# Patient Record
Sex: MALE | Race: WHITE | URBAN - METROPOLITAN AREA
[De-identification: names, ages, dates, MRNs, and addresses within clinical notes are randomized per-mention and may not be internally consistent; named-entity substitution may affect disease eponyms.]

---

## 2017-11-28 ENCOUNTER — APPOINTMENT (RX ONLY)
Dept: URBAN - METROPOLITAN AREA CLINIC 20 | Facility: CLINIC | Age: 32
Setting detail: DERMATOLOGY
End: 2017-11-28

## 2017-11-28 DIAGNOSIS — L81.4 OTHER MELANIN HYPERPIGMENTATION: ICD-10-CM

## 2017-11-28 DIAGNOSIS — D18.0 HEMANGIOMA: ICD-10-CM

## 2017-11-28 DIAGNOSIS — L71.8 OTHER ROSACEA: ICD-10-CM

## 2017-11-28 DIAGNOSIS — D22 MELANOCYTIC NEVI: ICD-10-CM

## 2017-11-28 DIAGNOSIS — L82.1 OTHER SEBORRHEIC KERATOSIS: ICD-10-CM

## 2017-11-28 PROBLEM — D22.71 MELANOCYTIC NEVI OF RIGHT LOWER LIMB, INCLUDING HIP: Status: ACTIVE | Noted: 2017-11-28

## 2017-11-28 PROBLEM — D18.01 HEMANGIOMA OF SKIN AND SUBCUTANEOUS TISSUE: Status: ACTIVE | Noted: 2017-11-28

## 2017-11-28 PROBLEM — D22.39 MELANOCYTIC NEVI OF OTHER PARTS OF FACE: Status: ACTIVE | Noted: 2017-11-28

## 2017-11-28 PROBLEM — D22.72 MELANOCYTIC NEVI OF LEFT LOWER LIMB, INCLUDING HIP: Status: ACTIVE | Noted: 2017-11-28

## 2017-11-28 PROBLEM — D22.62 MELANOCYTIC NEVI OF LEFT UPPER LIMB, INCLUDING SHOULDER: Status: ACTIVE | Noted: 2017-11-28

## 2017-11-28 PROBLEM — D22.61 MELANOCYTIC NEVI OF RIGHT UPPER LIMB, INCLUDING SHOULDER: Status: ACTIVE | Noted: 2017-11-28

## 2017-11-28 PROBLEM — D22.5 MELANOCYTIC NEVI OF TRUNK: Status: ACTIVE | Noted: 2017-11-28

## 2017-11-28 PROCEDURE — ? SUNSCREEN RECOMMENDATIONS

## 2017-11-28 PROCEDURE — ? COUNSELING

## 2017-11-28 PROCEDURE — ? PRESCRIPTION

## 2017-11-28 PROCEDURE — 99203 OFFICE O/P NEW LOW 30 MIN: CPT

## 2017-11-28 RX ORDER — CLINDAMYCIN PHOSPHATE 10 MG/G
GEL TOPICAL
Qty: 1 | Refills: 1 | Status: ERX | COMMUNITY
Start: 2017-11-28

## 2017-11-28 RX ORDER — METRONIDAZOLE 10 MG/G
GEL TOPICAL
Qty: 1 | Refills: 3 | Status: ERX | COMMUNITY
Start: 2017-11-28

## 2017-11-28 RX ORDER — SODIUM SULFACETAMIDE AND SULFUR 80; 40 MG/ML; MG/ML
LOTION TOPICAL
Qty: 1 | Refills: 3 | Status: ERX | COMMUNITY
Start: 2017-11-28

## 2017-11-28 RX ADMIN — METRONIDAZOLE: 10 GEL TOPICAL at 00:00

## 2017-11-28 RX ADMIN — SODIUM SULFACETAMIDE AND SULFUR: 80; 40 LOTION TOPICAL at 00:00

## 2017-11-28 RX ADMIN — CLINDAMYCIN PHOSPHATE: 10 GEL TOPICAL at 00:00

## 2017-11-28 ASSESSMENT — LOCATION DETAILED DESCRIPTION DERM
LOCATION DETAILED: RIGHT INFERIOR CENTRAL MALAR CHEEK
LOCATION DETAILED: LEFT DISTAL POSTERIOR THIGH
LOCATION DETAILED: LEFT VENTRAL PROXIMAL FOREARM
LOCATION DETAILED: RIGHT DISTAL POSTERIOR UPPER ARM
LOCATION DETAILED: LEFT PROXIMAL POSTERIOR UPPER ARM
LOCATION DETAILED: RIGHT SUPERIOR UPPER BACK
LOCATION DETAILED: RIGHT VENTRAL PROXIMAL FOREARM
LOCATION DETAILED: INFERIOR THORACIC SPINE
LOCATION DETAILED: RIGHT DISTAL POSTERIOR THIGH
LOCATION DETAILED: LEFT LATERAL PROXIMAL PRETIBIAL REGION
LOCATION DETAILED: RIGHT SUPERIOR MEDIAL FOREHEAD
LOCATION DETAILED: RIGHT INFERIOR MEDIAL UPPER BACK
LOCATION DETAILED: RIGHT INFERIOR MEDIAL MIDBACK
LOCATION DETAILED: RIGHT INFERIOR FOREHEAD
LOCATION DETAILED: RIGHT PROXIMAL PRETIBIAL REGION

## 2017-11-28 ASSESSMENT — LOCATION SIMPLE DESCRIPTION DERM
LOCATION SIMPLE: LEFT FOREARM
LOCATION SIMPLE: RIGHT FOREHEAD
LOCATION SIMPLE: RIGHT FOREARM
LOCATION SIMPLE: RIGHT PRETIBIAL REGION
LOCATION SIMPLE: LEFT PRETIBIAL REGION
LOCATION SIMPLE: RIGHT LOWER BACK
LOCATION SIMPLE: LEFT POSTERIOR UPPER ARM
LOCATION SIMPLE: RIGHT POSTERIOR UPPER ARM
LOCATION SIMPLE: RIGHT UPPER BACK
LOCATION SIMPLE: UPPER BACK
LOCATION SIMPLE: LEFT POSTERIOR THIGH
LOCATION SIMPLE: RIGHT CHEEK
LOCATION SIMPLE: RIGHT POSTERIOR THIGH

## 2017-11-28 ASSESSMENT — LOCATION ZONE DERM
LOCATION ZONE: ARM
LOCATION ZONE: FACE
LOCATION ZONE: TRUNK
LOCATION ZONE: LEG

## 2018-12-10 ENCOUNTER — OFFICE VISIT (OUTPATIENT)
Dept: URGENT CARE | Facility: PHYSICIAN GROUP | Age: 33
End: 2018-12-10

## 2018-12-10 VITALS
WEIGHT: 289 LBS | OXYGEN SATURATION: 98 % | TEMPERATURE: 97.5 F | DIASTOLIC BLOOD PRESSURE: 84 MMHG | HEART RATE: 64 BPM | BODY MASS INDEX: 40.46 KG/M2 | HEIGHT: 71 IN | SYSTOLIC BLOOD PRESSURE: 126 MMHG

## 2018-12-10 DIAGNOSIS — Z02.89 HEALTH EXAMINATION OF DEFINED SUBPOPULATION: ICD-10-CM

## 2018-12-10 DIAGNOSIS — Z02.4 ENCOUNTER FOR DEPARTMENT OF TRANSPORTATION (DOT) EXAMINATION FOR TRUCKING LICENCE: ICD-10-CM

## 2018-12-10 PROCEDURE — 7100 PR DOT PHYSICAL: Performed by: PHYSICIAN ASSISTANT

## 2018-12-10 NOTE — PATIENT INSTRUCTIONS
FROM DOT WEBSITE:    What medications disqualify a ?  PreviousNext  A  cannot take a controlled substance or prescription medication without a prescription from a licensed practitioner.  If a  uses a drug identified in 21 CFR 1308.11 (391.42(b)(12)) or any other substance such as amphetamine, a narcotic, or any other habit forming drug, The  is medically unqualified.  There is an exception: the prescribing doctor can write that the  is safe to be a  while taking the medication. In this case, the Medical Examiner may, but does not have to certify the .  Any anti-seizure medication used for the prevention of seizures is disqualifying.  The Medical Examiner has 2 ways to determine if any medication a  uses will adversely affect safe operation of a CMV:  1. Review each medication - prescription, non-prescription and supplement  2. Request a letter from the prescribing doctor  Last Updated : September 18, 2017

## 2020-10-29 ENCOUNTER — OFFICE VISIT (OUTPATIENT)
Dept: URGENT CARE | Facility: PHYSICIAN GROUP | Age: 35
End: 2020-10-29

## 2020-10-29 VITALS
HEART RATE: 66 BPM | BODY MASS INDEX: 39.76 KG/M2 | TEMPERATURE: 96.9 F | DIASTOLIC BLOOD PRESSURE: 72 MMHG | HEIGHT: 71 IN | RESPIRATION RATE: 15 BRPM | WEIGHT: 284 LBS | OXYGEN SATURATION: 98 % | SYSTOLIC BLOOD PRESSURE: 118 MMHG

## 2020-10-29 DIAGNOSIS — Z02.4 ENCOUNTER FOR CDL (COMMERCIAL DRIVING LICENSE) EXAM: ICD-10-CM

## 2020-10-29 PROCEDURE — 7100 PR DOT PHYSICAL: Performed by: FAMILY MEDICINE

## 2020-10-29 RX ORDER — PHENTERMINE HYDROCHLORIDE 37.5 MG/1
37.5 CAPSULE ORAL EVERY MORNING
COMMUNITY

## 2020-10-29 NOTE — PROGRESS NOTES
"Subjective:      Isael Grossman Jr. is a 35 y.o. male who presents with No chief complaint on file.    See scanned history, physical  and clearance status in EPIC    Patient denies any history of seizures, dialysis, insulin use, pacemaker/defibrillator, syncope, arrhythmias/murmurs, vertigo/meniere's disease, illicit drug use, regular sedating medication use, ETOH abuse, or any weakness/paresthesias to extremities.     Cleared:  x2yrs    Renew: yes    Corrective lenses:  none          HPI    ROS       Objective:     /72   Pulse 66   Temp 36.1 °C (96.9 °F)   Resp 15   Ht 1.803 m (5' 11\")   Wt (!) 128.8 kg (284 lb)   SpO2 98%   BMI 39.61 kg/m²      Physical Exam            Assessment/Plan:            "

## 2022-11-07 ENCOUNTER — OFFICE VISIT (OUTPATIENT)
Dept: URGENT CARE | Facility: PHYSICIAN GROUP | Age: 37
End: 2022-11-07

## 2022-11-07 VITALS
WEIGHT: 265 LBS | HEART RATE: 75 BPM | OXYGEN SATURATION: 97 % | SYSTOLIC BLOOD PRESSURE: 126 MMHG | DIASTOLIC BLOOD PRESSURE: 80 MMHG | RESPIRATION RATE: 18 BRPM | BODY MASS INDEX: 37.1 KG/M2 | TEMPERATURE: 96.9 F | HEIGHT: 71 IN

## 2022-11-07 DIAGNOSIS — Z02.4 ENCOUNTER FOR CDL (COMMERCIAL DRIVING LICENSE) EXAM: ICD-10-CM

## 2022-11-07 PROCEDURE — 7100 PR DOT PHYSICAL: Performed by: PHYSICIAN ASSISTANT

## 2022-11-08 NOTE — PROGRESS NOTES
"Subjective:   Isael Grossman Jr. is a 37 y.o. male who presents for Other (DOT Physical)      HPI  For complete documentation please see DOT physical form scanned into chart      Medications:    phentermine    Allergies: Patient has no known allergies.    Problem List: Isael Grossman Jr. does not have a problem list on file.    Surgical History:  No past surgical history on file.    Past Social Hx: Isael Grossman Jr.  reports that he has never smoked. He has never used smokeless tobacco. He reports current alcohol use of about 1.2 oz per week. He reports that he does not use drugs.     Past Family Hx:  Isael Grossman Jr. family history is not on file.     Problem list, medications, and allergies reviewed by myself today in Epic.     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

## 2024-11-05 ENCOUNTER — OFFICE VISIT (OUTPATIENT)
Dept: URGENT CARE | Facility: PHYSICIAN GROUP | Age: 39
End: 2024-11-05

## 2024-11-05 VITALS
HEART RATE: 58 BPM | RESPIRATION RATE: 16 BRPM | OXYGEN SATURATION: 98 % | HEIGHT: 71 IN | TEMPERATURE: 97.5 F | BODY MASS INDEX: 35.7 KG/M2 | SYSTOLIC BLOOD PRESSURE: 128 MMHG | DIASTOLIC BLOOD PRESSURE: 70 MMHG | WEIGHT: 255 LBS

## 2024-11-05 DIAGNOSIS — Z02.4 ENCOUNTER FOR CDL (COMMERCIAL DRIVING LICENSE) EXAM: ICD-10-CM

## 2024-11-05 PROCEDURE — 7100 PR DOT PHYSICAL: Performed by: PHYSICIAN ASSISTANT

## 2024-11-05 RX ORDER — TESTOSTERONE CYPIONATE 200 MG/ML
INJECTION, SOLUTION INTRAMUSCULAR
COMMUNITY
Start: 2024-10-21

## 2024-11-05 RX ORDER — SEMAGLUTIDE 2.4 MG/.75ML
INJECTION, SOLUTION SUBCUTANEOUS
COMMUNITY
Start: 2024-10-15

## 2024-11-05 NOTE — PROGRESS NOTES
Patient presents for DOT exam. See scanned documentation for physical and health questionnaire. Denies SOB, CP or dizziness on exertion. Denies h/o respiratory or cardiac pathology, or seizures. No acute concerning findings on exam today. Patient certified for 2 years at this time.     Vitals:    11/05/24 1018   BP: 128/70   Pulse: (!) 58   Resp: 16   Temp: 36.4 °C (97.5 °F)   SpO2: 98%     Patient takes Ozempic once a week and phentermine and has been on this medication intermittently for the past several years.  Has no side effects with his medications.  Is only on both of these for weight loss and does not have any history of diabetes.

## 2025-04-05 ENCOUNTER — OFFICE VISIT (OUTPATIENT)
Dept: URGENT CARE | Facility: PHYSICIAN GROUP | Age: 40
End: 2025-04-05
Payer: COMMERCIAL

## 2025-04-05 VITALS
TEMPERATURE: 97.2 F | BODY MASS INDEX: 35.7 KG/M2 | HEIGHT: 71 IN | HEART RATE: 80 BPM | RESPIRATION RATE: 16 BRPM | OXYGEN SATURATION: 98 % | SYSTOLIC BLOOD PRESSURE: 128 MMHG | WEIGHT: 255 LBS | DIASTOLIC BLOOD PRESSURE: 78 MMHG

## 2025-04-05 DIAGNOSIS — Z20.818 EXPOSURE TO STREP THROAT: ICD-10-CM

## 2025-04-05 DIAGNOSIS — R11.2 NAUSEA AND VOMITING, UNSPECIFIED VOMITING TYPE: ICD-10-CM

## 2025-04-05 LAB
FLUAV RNA SPEC QL NAA+PROBE: NEGATIVE
FLUBV RNA SPEC QL NAA+PROBE: NEGATIVE
RSV RNA SPEC QL NAA+PROBE: NEGATIVE
S PYO DNA SPEC NAA+PROBE: NOT DETECTED
SARS-COV-2 RNA RESP QL NAA+PROBE: NEGATIVE

## 2025-04-05 PROCEDURE — 87651 STREP A DNA AMP PROBE: CPT

## 2025-04-05 PROCEDURE — 3078F DIAST BP <80 MM HG: CPT

## 2025-04-05 PROCEDURE — 0241U POCT CEPHEID COV-2, FLU A/B, RSV - PCR: CPT

## 2025-04-05 PROCEDURE — 99213 OFFICE O/P EST LOW 20 MIN: CPT

## 2025-04-05 PROCEDURE — 3074F SYST BP LT 130 MM HG: CPT

## 2025-04-05 RX ORDER — ONDANSETRON 4 MG/1
4 TABLET, ORALLY DISINTEGRATING ORAL EVERY 6 HOURS PRN
Qty: 15 TABLET | Refills: 0 | Status: SHIPPED | OUTPATIENT
Start: 2025-04-05

## 2025-04-05 RX ORDER — AMOXICILLIN 500 MG/1
500 CAPSULE ORAL 2 TIMES DAILY
Qty: 20 CAPSULE | Refills: 0 | Status: SHIPPED | OUTPATIENT
Start: 2025-04-05 | End: 2025-04-15

## 2025-04-05 RX ORDER — ONDANSETRON 4 MG/1
4 TABLET, ORALLY DISINTEGRATING ORAL ONCE
Status: COMPLETED | OUTPATIENT
Start: 2025-04-05 | End: 2025-04-05

## 2025-04-05 RX ORDER — MONTELUKAST SODIUM 4 MG/1
4 TABLET, CHEWABLE ORAL NIGHTLY
COMMUNITY

## 2025-04-05 RX ADMIN — ONDANSETRON 4 MG: 4 TABLET, ORALLY DISINTEGRATING ORAL at 10:27

## 2025-04-05 ASSESSMENT — ENCOUNTER SYMPTOMS
CHILLS: 1
NAUSEA: 1
VOMITING: 1
ABDOMINAL PAIN: 0
SORE THROAT: 0

## 2025-04-05 NOTE — PROGRESS NOTES
Verbal consent was acquired by the patient to use [employer] ambient listening note generation during this visit   Subjective:   Isael Grossman Jr. is a 40 y.o. male who presents for Vomiting (Chills, fever, sore throat, x 3 days)      HPI:  History of Present Illness  The patient is a 40-year-old male who presents for evaluation of nausea and vomiting.    He has been providing care for his two children, both of whom have been diagnosed with strep throat. He began experiencing symptoms on Wednesday night, including body aches, chills, and intermittent fever. He also reported an episode of vomiting that lasted between 36 to 42 hours, which was triggered by fluid intake. His symptoms temporarily subsided yesterday, allowing him to consume some chicken noodle soup and bread, as well as some fluids. However, the vomiting resumed last night, occurring intermittently with each attempt at fluid intake. He also experienced throat discomfort on Wednesday night and Thursday, but this symptom has since resolved. He was unable to attend work on Thursday and Friday due to his illness. He reports no abdominal pain but does experience dry heaving. His primary concern at this time is the persistent nausea and vomiting. He has been taking Tylenol to manage the neck and back soreness but notes that these medications also induce vomiting.         Review of Systems   Constitutional:  Positive for chills.   HENT:  Negative for sore throat.    Gastrointestinal:  Positive for nausea and vomiting. Negative for abdominal pain.       Medications:    Current Outpatient Medications on File Prior to Visit   Medication Sig Dispense Refill    montelukast (SINGULAIR) 4 MG Chew Tab Chew 4 mg every evening.      testosterone cypionate (DEPO-TESTOSTERONE) 200 MG/ML injection inject 0.2mls intramuscularly twice a week.      WEGOVY 2.4 MG/0.75ML Solution Auto-injector Pen-injector inject 2.4 mg subcutaneously once weekly per schedule (Patient not  "taking: Reported on 4/5/2025)      phentermine 37.5 MG capsule Take 1 Capsule by mouth every morning. (Patient not taking: Reported on 4/5/2025)       No current facility-administered medications on file prior to visit.        Allergies:   Patient has no known allergies.    Problem List:   There is no problem list on file for this patient.     Surgical History:  No past surgical history on file.    Past Social Hx:   Social History     Tobacco Use    Smoking status: Never    Smokeless tobacco: Never   Vaping Use    Vaping status: Never Used   Substance Use Topics    Alcohol use: Yes     Alcohol/week: 1.2 oz     Types: 2 Cans of beer per week     Comment: rare    Drug use: Never          Problem list, medications, and allergies reviewed by myself today in Epic.     Objective:     /78   Pulse 80   Temp 36.2 °C (97.2 °F)   Resp 16   Ht 1.803 m (5' 11\")   Wt 116 kg (255 lb)   SpO2 98%   BMI 35.57 kg/m²     Physical Exam  Vitals and nursing note reviewed.   Constitutional:       General: He is not in acute distress.     Appearance: Normal appearance. He is normal weight. He is not ill-appearing, toxic-appearing or diaphoretic.   HENT:      Head: Normocephalic and atraumatic.      Right Ear: Tympanic membrane, ear canal and external ear normal. There is no impacted cerumen.      Left Ear: Tympanic membrane, ear canal and external ear normal. There is no impacted cerumen.      Nose: Nose normal. No congestion or rhinorrhea.      Mouth/Throat:      Mouth: Mucous membranes are moist.      Pharynx: Oropharynx is clear. No oropharyngeal exudate or posterior oropharyngeal erythema.   Cardiovascular:      Rate and Rhythm: Normal rate and regular rhythm.      Pulses: Normal pulses.      Heart sounds: Normal heart sounds. No murmur heard.     No friction rub. No gallop.   Pulmonary:      Effort: Pulmonary effort is normal. No respiratory distress.      Breath sounds: Normal breath sounds. No stridor. No wheezing, " rhonchi or rales.   Chest:      Chest wall: No tenderness.   Musculoskeletal:      Cervical back: Normal range of motion and neck supple. No tenderness.   Lymphadenopathy:      Cervical: No cervical adenopathy.   Skin:     General: Skin is warm and dry.      Capillary Refill: Capillary refill takes less than 2 seconds.   Neurological:      General: No focal deficit present.      Mental Status: He is alert and oriented to person, place, and time. Mental status is at baseline.      Gait: Gait normal.   Psychiatric:         Mood and Affect: Mood normal.         Behavior: Behavior normal.         Thought Content: Thought content normal.         Judgment: Judgment normal.         Assessment/Plan:     Diagnosis and associated orders:   1. Exposure to strep throat  - POCT GROUP A STREP, PCR  - amoxicillin (AMOXIL) 500 MG Cap; Take 1 Capsule by mouth 2 times a day for 10 days.  Dispense: 20 Capsule; Refill: 0    2. Nausea and vomiting, unspecified vomiting type  - POCT CoV-2, Flu A/B, RSV by PCR  - ondansetron (Zofran ODT) dispertab 4 mg  - ondansetron (ZOFRAN ODT) 4 MG TABLET DISPERSIBLE; Take 1 Tablet by mouth every 6 hours as needed for Nausea/Vomiting for up to 15 doses.  Dispense: 15 Tablet; Refill: 0    Other orders  - montelukast (SINGULAIR) 4 MG Chew Tab; Chew 4 mg every evening.    Results for orders placed or performed in visit on 04/05/25   POCT GROUP A STREP, PCR    Collection Time: 04/05/25 10:34 AM   Result Value Ref Range    POC Group A Strep, PCR Not Detected Not Detected, Invalid   POCT CoV-2, Flu A/B, RSV by PCR    Collection Time: 04/05/25 10:35 AM   Result Value Ref Range    SARS-CoV-2 by PCR Negative Negative, Invalid    Influenza virus A RNA Negative Negative, Invalid    Influenza virus B, PCR Negative Negative, Invalid    RSV, PCR Negative Negative, Invalid       Comments/MDM:   Pt is clinically stable at today's acute urgent care visit.  No acute distress noted. Appropriate for outpatient management  at this time.     Assessment & Plan    He reports persistent nausea and vomiting since Wednesday night, with some relief on Thursday but recurrence on Friday. He has been unable to keep fluids down and has experienced dry heaving. An antiemetic will be administered in the clinic today, and a prescription for home use will be provided. He is advised to maintain adequate hydration and use Tylenol or ibuprofen for discomfort.      Given his close exposure to his children who had strep throat, Empirical treatment with antibiotics will be initiated. He is advised to replace his toothbrush within 48 hours.            Discussed DDx, management options (risks,benefits, and alternatives to planned treatment), natural progression and supportive care.  Expressed understanding and the treatment plan was agreed upon. Questions were encouraged and answered   Return to urgent care prn if new or worsening sx or if there is no improvement in condition prn.    Educated in Red flags and indications to immediately call 911 or present to the Emergency Department.   Advised the patient to follow-up with the primary care physician for recheck, reevaluation, and consideration of further management.    I personally reviewed prior external notes and test results pertinent to today's visit.  I have independently reviewed and interpreted all diagnostics ordered during this urgent care acute visit.       Please note that this dictation was created using voice recognition software. I have made a reasonable attempt to correct obvious errors, but I expect that there are errors of grammar and possibly content that I did not discover before finalizing the note.    This note was electronically signed by FERNANDO Meadows